# Patient Record
Sex: MALE | Race: WHITE
[De-identification: names, ages, dates, MRNs, and addresses within clinical notes are randomized per-mention and may not be internally consistent; named-entity substitution may affect disease eponyms.]

---

## 2018-02-15 ENCOUNTER — HOSPITAL ENCOUNTER (EMERGENCY)
Dept: HOSPITAL 74 - FER | Age: 28
Discharge: HOME | End: 2018-02-15
Payer: COMMERCIAL

## 2018-02-15 VITALS — HEART RATE: 75 BPM | SYSTOLIC BLOOD PRESSURE: 142 MMHG | DIASTOLIC BLOOD PRESSURE: 98 MMHG | TEMPERATURE: 98 F

## 2018-02-15 VITALS — BODY MASS INDEX: 41.1 KG/M2

## 2018-02-15 DIAGNOSIS — R07.9: Primary | ICD-10-CM

## 2018-02-15 LAB
ALBUMIN SERPL-MCNC: 3.9 G/DL (ref 3.4–5)
ALP SERPL-CCNC: 81 U/L (ref 45–117)
ALT SERPL-CCNC: 81 U/L (ref 12–78)
ANION GAP SERPL CALC-SCNC: 8 MMOL/L (ref 8–16)
AST SERPL-CCNC: 33 U/L (ref 15–37)
BASOPHILS # BLD: 1 % (ref 0–2)
BILIRUB SERPL-MCNC: 0.4 MG/DL (ref 0.2–1)
BUN SERPL-MCNC: 19 MG/DL (ref 7–18)
CALCIUM SERPL-MCNC: 8.9 MG/DL (ref 8.5–10.1)
CHLORIDE SERPL-SCNC: 106 MMOL/L (ref 98–107)
CO2 SERPL-SCNC: 26 MMOL/L (ref 21–32)
CREAT SERPL-MCNC: 0.8 MG/DL (ref 0.7–1.3)
DEPRECATED RDW RBC AUTO: 13.4 % (ref 11.9–15.9)
EOSINOPHIL # BLD: 3.6 % (ref 0–4.5)
GLUCOSE SERPL-MCNC: 101 MG/DL (ref 74–106)
HCT VFR BLD CALC: 45 % (ref 35.4–49)
HGB BLD-MCNC: 15.3 GM/DL (ref 11.7–16.9)
LYMPHOCYTES # BLD: 35.9 % (ref 8–40)
MCH RBC QN AUTO: 28.6 PG (ref 25.7–33.7)
MCHC RBC AUTO-ENTMCNC: 34 G/DL (ref 32–35.9)
MCV RBC: 84.3 FL (ref 80–96)
MONOCYTES # BLD AUTO: 9.7 % (ref 3.8–10.2)
NEUTROPHILS # BLD: 49.8 % (ref 42.8–82.8)
PLATELET # BLD AUTO: 272 K/MM3 (ref 134–434)
PMV BLD: 9.1 FL (ref 7.5–11.1)
POTASSIUM SERPLBLD-SCNC: 3.9 MMOL/L (ref 3.5–5.1)
PROT SERPL-MCNC: 7.7 G/DL (ref 6.4–8.2)
RBC # BLD AUTO: 5.35 M/MM3 (ref 4–5.6)
SODIUM SERPL-SCNC: 140 MMOL/L (ref 136–145)
WBC # BLD AUTO: 10.8 K/MM3 (ref 4–10)

## 2018-02-15 NOTE — PDOC
History of Present Illness





- General


Chief Complaint: Chest Pain


Stated Complaint: CHEST PAIN/ARM TINGLING


Time Seen by Provider: 02/15/18 00:53


History Source: Patient


Exam Limitations: No Limitations





- History of Present Illness


Initial Comments: 





02/15/18 01:06


27M no sig PMHx with cc of anterior chest "tightness" as well as bilateral UE 

"tingling". CP began 5d ago, cannot remember circumstances around onset of 

pain. Worse with movement. Tonight noted that it's been more difficult than 

prior to take a deep breath, but no true SOB. +slight cough tonight, non 

productive, no fever/chills. Recalls similar pain last year which was a result 

of heavy lifting/muscle strain. No N/V, no abd pain. No diaphoresis. No LE pain/

swelling/recent long travel/immobilization. No fam hx of CAD at young age, no 

familial hx of VTE.


02/15/18 01:09








Past History





- Past Medical History


Allergies/Adverse Reactions: 


 Allergies











Allergy/AdvReac Type Severity Reaction Status Date / Time


 


No Known Allergies Allergy   Unverified 02/15/18 01:10











Home Medications: 


Ambulatory Orders





NK [No Known Home Medication]  02/15/18 











**Review of Systems





- Review of Systems


Constitutional: No: Diaphoresis, Fever, Night Sweats, Weakness


Respiratory: Yes: Cough.  No: Productive cough, Hemoptysis


Cardiac (ROS): Yes: Chest Tightness.  No: Palpitations, Syncope


ABD/GI: No: Abdominal Distended, Diarrhea, Difficulty Swallowing


: No: Dysuria


Musculoskeletal: No: Back Pain


Neurological: No: Headache





*Physical Exam





- Physical Exam


General Appearance: Yes: Nourished, Appropriately Dressed, Apparent Distress


HEENT: positive: Normal ENT Inspection, Normal Voice, Symmetrical


Neck: negative: Tender


Respiratory/Chest: positive: Lungs Clear, Normal Breath Sounds.  negative: 

Respiratory Distress, Accessory Muscle Use, Labored Respiration, Rapid RR


Cardiovascular: positive: Regular Rhythm, Regular Rate, S1, S2.  negative: Edema

, JVD


Musculoskeletal: negative: CVA Tenderness


Extremity: positive: Normal Inspection.  negative: Swelling, Calf Tenderness, 

Erythema


Neurologic: positive: CNs II-XII NML intact, Fully Oriented, Alert, Normal Mood/

Affect





**Heart Score/ECG Review





- History


History: Slightly suspicious





- Electrocardiogram


EKG: Normal





- Age


Age: </= 45





- Risk Factors


Risk Factors Heart Score: Yes Hx Obesity


Based on the list above the patient has:: 1-2 risk factors





- Troponin


Troponin: </= normal limit





- Score


Heart Score - Total: 1





ED Treatment Course





- LABORATORY


CBC & Chemistry Diagram: 


 02/15/18 01:30





 02/15/18 01:30





Medical Decision Making





- Medical Decision Making





02/15/18 01:13


27M with chest tightness x5 days, cough and bilat UE "tingling". Overall, most 

likely msk, HEART score 1 (obesity), PERC neg. Unlikely cardiac, unlikely PE. 


- labs, CXR, EKG, motrin


- reassess





02/15/18 03:34


CXR, labs, EKG reviewed - no acute findings which are concerning. Will 

discharge pt home with instructions to take motrin q6hrs for pain, f/u with 

PMD. Return if sxs worsen. Most likely msk.





*DC/Admit/Observation/Transfer


Diagnosis at time of Disposition: 


 Chest pain








- Discharge Dispostion


Disposition: HOME


Condition at time of disposition: Good





- Referrals





- Patient Instructions


Printed Discharge Instructions:  DI for Chest Pain


Additional Instructions: 


You were seen in the ER for chest pain. You had blood work, a chest x-ray and 

an EKG which did not show any major abnormalities which are concerning at this 

time. Please take motrin every 6 hours for pain and follow up with primary 

care. Return sooner if your symptoms worsen.








- Post Discharge Activity

## 2018-02-15 NOTE — EKG
Test Reason : 

Blood Pressure : ***/*** mmHG

Vent. Rate : 069 BPM     Atrial Rate : 069 BPM

   P-R Int : 152 ms          QRS Dur : 102 ms

    QT Int : 398 ms       P-R-T Axes : 032 017 040 degrees

   QTc Int : 426 ms

 

NORMAL SINUS RHYTHM

CANNOT RULE OUT SEPTAL INFARCT , AGE UNDETERMINED  (possibly due to

lead placeement or chest shape?)

Possible LVH  

ABNORMAL ECG

NO PREVIOUS ECGS AVAILABLE

Confirmed by RADHA BROWN, VLADISLAV ([id number]) on 2/15/2018 10:58:52 AM

 

Referred By: MD HUGHES           Confirmed By:VLADISLAV GARCIA MD

## 2025-08-07 PROBLEM — Z00.00 ENCOUNTER FOR PREVENTIVE HEALTH EXAMINATION: Status: ACTIVE | Noted: 2025-08-07

## 2025-08-10 PROBLEM — A49.1 STREPTOCOCCUS INFECTION: Status: ACTIVE | Noted: 2025-08-10

## 2025-08-12 ENCOUNTER — APPOINTMENT (OUTPATIENT)
Dept: GASTROENTEROLOGY | Facility: CLINIC | Age: 35
End: 2025-08-12

## 2025-08-14 ENCOUNTER — APPOINTMENT (OUTPATIENT)
Dept: GASTROENTEROLOGY | Facility: CLINIC | Age: 35
End: 2025-08-14
Payer: COMMERCIAL

## 2025-08-14 VITALS
HEART RATE: 90 BPM | SYSTOLIC BLOOD PRESSURE: 120 MMHG | BODY MASS INDEX: 47.8 KG/M2 | DIASTOLIC BLOOD PRESSURE: 80 MMHG | HEIGHT: 64 IN | OXYGEN SATURATION: 98 % | WEIGHT: 280 LBS

## 2025-08-14 PROCEDURE — 99204 OFFICE O/P NEW MOD 45 MIN: CPT

## 2025-08-17 PROBLEM — Z80.8 FAMILY HISTORY OF MALIGNANT NEOPLASM OF SKIN: Status: ACTIVE | Noted: 2025-08-10

## 2025-08-17 PROBLEM — R23.2 ABNORMAL FLUSHING AND SWEATING: Status: ACTIVE | Noted: 2025-08-11

## 2025-08-17 PROBLEM — R74.8 ELEVATED LIVER ENZYMES: Status: ACTIVE | Noted: 2025-08-14

## 2025-08-17 PROBLEM — Z82.49 FAMILY HISTORY OF HYPERTENSION: Status: ACTIVE | Noted: 2025-08-10

## 2025-08-17 PROBLEM — J02.0 ACUTE INFECTIVE PHARYNGITIS DUE TO STREPTOCOCCUS SPECIES: Status: ACTIVE | Noted: 2025-08-11

## 2025-08-22 ENCOUNTER — LABORATORY RESULT (OUTPATIENT)
Age: 35
End: 2025-08-22

## 2025-08-22 LAB
ALBUMIN SERPL ELPH-MCNC: 4.1 G/DL
ALP BLD-CCNC: 62 U/L
ALT SERPL-CCNC: 178 U/L
ANION GAP SERPL CALC-SCNC: 13 MMOL/L
AST SERPL-CCNC: 135 U/L
BILIRUB DIRECT SERPL-MCNC: 0.21 MG/DL
BILIRUB SERPL-MCNC: 0.5 MG/DL
BUN SERPL-MCNC: 10 MG/DL
CALCIUM SERPL-MCNC: 9.2 MG/DL
CERULOPLASMIN SERPL-MCNC: 32 MG/DL
CHLORIDE SERPL-SCNC: 104 MMOL/L
CO2 SERPL-SCNC: 23 MMOL/L
CREAT SERPL-MCNC: 0.67 MG/DL
EGFRCR SERPLBLD CKD-EPI 2021: 126 ML/MIN/1.73M2
FERRITIN SERPL-MCNC: 592 NG/ML
GLUCOSE SERPL-MCNC: 92 MG/DL
IRON SATN MFR SERPL: 21 %
IRON SERPL-MCNC: 75 UG/DL
POTASSIUM SERPL-SCNC: 4.8 MMOL/L
PROT SERPL-MCNC: 7.5 G/DL
SODIUM SERPL-SCNC: 140 MMOL/L
TIBC SERPL-MCNC: 350 UG/DL
TRANSFERRIN SERPL-MCNC: 272 MG/DL
UIBC SERPL-MCNC: 275 UG/DL

## 2025-08-23 LAB
GLIADIN IGA SER QL: 5.9 U/ML
GLIADIN IGG SER QL: 2 U/ML
GLIADIN PEPTIDE IGA SER-ACNC: NEGATIVE
GLIADIN PEPTIDE IGG SER-ACNC: NEGATIVE
HBV SURFACE AG SER QL: NONREACTIVE
HCV AB SER QL: REACTIVE
HCV S/CO RATIO: 3.07 S/CO
TTG IGA SER IA-ACNC: <0.5 U/ML
TTG IGA SER-ACNC: NEGATIVE
TTG IGG SER IA-ACNC: 1.3 U/ML
TTG IGG SER IA-ACNC: NEGATIVE

## 2025-08-28 ENCOUNTER — APPOINTMENT (OUTPATIENT)
Dept: CARDIOLOGY | Facility: CLINIC | Age: 35
End: 2025-08-28